# Patient Record
Sex: FEMALE | Race: WHITE | NOT HISPANIC OR LATINO
[De-identification: names, ages, dates, MRNs, and addresses within clinical notes are randomized per-mention and may not be internally consistent; named-entity substitution may affect disease eponyms.]

---

## 2017-05-08 ENCOUNTER — RESULT REVIEW (OUTPATIENT)
Age: 62
End: 2017-05-08

## 2019-04-30 ENCOUNTER — NON-APPOINTMENT (OUTPATIENT)
Age: 64
End: 2019-04-30

## 2019-04-30 ENCOUNTER — APPOINTMENT (OUTPATIENT)
Dept: CARDIOLOGY | Facility: CLINIC | Age: 64
End: 2019-04-30
Payer: COMMERCIAL

## 2019-04-30 VITALS
RESPIRATION RATE: 15 BRPM | HEIGHT: 63 IN | SYSTOLIC BLOOD PRESSURE: 124 MMHG | BODY MASS INDEX: 29.77 KG/M2 | DIASTOLIC BLOOD PRESSURE: 76 MMHG | WEIGHT: 168 LBS | HEART RATE: 68 BPM

## 2019-04-30 PROCEDURE — 93000 ELECTROCARDIOGRAM COMPLETE: CPT

## 2019-04-30 PROCEDURE — 99213 OFFICE O/P EST LOW 20 MIN: CPT

## 2019-05-14 ENCOUNTER — TRANSCRIPTION ENCOUNTER (OUTPATIENT)
Age: 64
End: 2019-05-14

## 2019-05-23 ENCOUNTER — TRANSCRIPTION ENCOUNTER (OUTPATIENT)
Age: 64
End: 2019-05-23

## 2019-05-23 VITALS — BODY MASS INDEX: 20.9 KG/M2 | WEIGHT: 118 LBS

## 2019-06-17 ENCOUNTER — APPOINTMENT (OUTPATIENT)
Dept: CARDIOLOGY | Facility: CLINIC | Age: 64
End: 2019-06-17
Payer: COMMERCIAL

## 2019-06-17 VITALS
DIASTOLIC BLOOD PRESSURE: 79 MMHG | BODY MASS INDEX: 21.44 KG/M2 | SYSTOLIC BLOOD PRESSURE: 126 MMHG | WEIGHT: 121 LBS | RESPIRATION RATE: 15 BRPM | HEIGHT: 63 IN | HEART RATE: 70 BPM

## 2019-06-17 PROCEDURE — 93015 CV STRESS TEST SUPVJ I&R: CPT

## 2019-06-17 PROCEDURE — 99213 OFFICE O/P EST LOW 20 MIN: CPT | Mod: 25

## 2019-06-17 PROCEDURE — ZZZZZ: CPT

## 2019-06-17 PROCEDURE — 93306 TTE W/DOPPLER COMPLETE: CPT

## 2020-06-30 ENCOUNTER — APPOINTMENT (OUTPATIENT)
Dept: CARDIOLOGY | Facility: CLINIC | Age: 65
End: 2020-06-30
Payer: COMMERCIAL

## 2020-06-30 VITALS
DIASTOLIC BLOOD PRESSURE: 80 MMHG | BODY MASS INDEX: 21.62 KG/M2 | HEIGHT: 63 IN | HEART RATE: 60 BPM | WEIGHT: 122 LBS | SYSTOLIC BLOOD PRESSURE: 125 MMHG | RESPIRATION RATE: 15 BRPM

## 2020-06-30 PROCEDURE — ZZZZZ: CPT

## 2020-06-30 PROCEDURE — 93015 CV STRESS TEST SUPVJ I&R: CPT

## 2020-06-30 PROCEDURE — 93306 TTE W/DOPPLER COMPLETE: CPT

## 2020-06-30 PROCEDURE — 99213 OFFICE O/P EST LOW 20 MIN: CPT | Mod: 25

## 2020-07-01 NOTE — PHYSICAL EXAM
[Well Groomed] : well groomed [General Appearance - Well Developed] : well developed [Normal Appearance] : normal appearance [No Deformities] : no deformities [General Appearance - Well Nourished] : well nourished [General Appearance - In No Acute Distress] : no acute distress [Normal Oropharynx] : normal oropharynx [Normal Oral Mucosa] : normal oral mucosa [Normal Conjunctiva] : the conjunctiva exhibited no abnormalities [Normal Jugular Venous A Waves Present] : normal jugular venous A waves present [JVD Elevated _____cm] : JVD elevated [unfilled] ~U cm above clavicle [Normal Jugular Venous V Waves Present] : normal jugular venous V waves present [No Jugular Venous Montgomery A Waves] : no jugular venous montgomery A waves [Respiration, Rhythm And Depth] : normal respiratory rhythm and effort [Bowel Sounds] : normal bowel sounds [Auscultation Breath Sounds / Voice Sounds] : lungs were clear to auscultation bilaterally [Abdomen Soft] : soft [Exaggerated Use Of Accessory Muscles For Inspiration] : no accessory muscle use [Nail Clubbing] : no clubbing of the fingernails [Abnormal Walk] : normal gait [Gait - Sufficient For Exercise Testing] : the gait was sufficient for exercise testing [Cyanosis, Localized] : no localized cyanosis [Skin Color & Pigmentation] : normal skin color and pigmentation [Skin Turgor] : normal skin turgor [] : no rash [Impaired Insight] : insight and judgment were intact [No Anxiety] : not feeling anxious [Oriented To Time, Place, And Person] : oriented to person, place, and time [Normal] : normal [5th Left ICS - MCL] : palpated at the 5th LICS in the midclavicular line [Apical Thrill] : no thrill palpable at the apex [Normal Rate] : normal [No Precordial Heave] : no precordial heave was noted [Normal S2] : normal S2 [Normal S1] : normal S1 [Rhythm Regular] : regular [Click] : no click [S4] : no S4 [S3] : no S3 [No Gallop] : no gallop heard [Pericardial Rub] : no pericardial rub [I] : a grade 1 [Right Carotid Bruit] : no bruit heard over the right carotid [Left Carotid Bruit] : no bruit heard over the left carotid [Right Femoral Bruit] : no bruit heard over the right femoral artery [Left Femoral Bruit] : no bruit heard over the left femoral artery [2+] : left 2+ [No Abnormalities] : the abdominal aorta was not enlarged and no bruit was heard [No Pitting Edema] : no pitting edema present

## 2020-07-01 NOTE — DISCUSSION/SUMMARY
[FreeTextEntry1] : This is a 65-year-old female past medical history significant for mitral valve prolapse, occasional palpitations, occasional dyspnea on exertion, comes in for followup cardiac evaluation.  She denies chest pain, shortness breath, dizziness or syncope.  She has no history of rheumatic fever.  She does not drink excessive caffeine or alcohol.\par The patient had a normal exercise stress test June 30, 2020.\par She will have an echo Doppler examination to evaluate her mitral valve prolapse and mitral valve regurgitation, left ventricular function, and chamber size.\par The patient does get occasional dyspnea on exertion.  She will followup with me in 6 months.\par She has no known cardiac risk factors.\par Electrocardiogram done April 30, 2019 demonstrated sinus bradycardia rate of 58 beats per minute and is otherwise unremarkable.\par The patient will maintain adequate hydration. \par The patient understands that aerobic exercises must be increased to 40 minutes 4 times per week. A detailed discussion of lifestyle modification was done today. The patient has a good understanding of the diagnosis, and treatment plan. Lifestyle modification was also outlined.

## 2020-07-01 NOTE — REASON FOR VISIT
[Follow-Up - Clinic] : a clinic follow-up of [FreeTextEntry1] : Mitral regurgitation, palpitations,  [Mitral Regurgitation] : mitral regurgitation [Palpitations] : palpitations [Dyspnea] : dyspnea

## 2020-10-02 ENCOUNTER — APPOINTMENT (OUTPATIENT)
Dept: MRI IMAGING | Facility: HOSPITAL | Age: 65
End: 2020-10-02
Payer: COMMERCIAL

## 2020-10-02 ENCOUNTER — OUTPATIENT (OUTPATIENT)
Dept: OUTPATIENT SERVICES | Facility: HOSPITAL | Age: 65
LOS: 1 days | End: 2020-10-02
Payer: COMMERCIAL

## 2020-10-02 PROCEDURE — A9579: CPT

## 2020-10-02 PROCEDURE — 72141 MRI NECK SPINE W/O DYE: CPT

## 2020-10-02 PROCEDURE — 70553 MRI BRAIN STEM W/O & W/DYE: CPT | Mod: 26

## 2020-10-02 PROCEDURE — 72141 MRI NECK SPINE W/O DYE: CPT | Mod: 26

## 2020-10-02 PROCEDURE — 70553 MRI BRAIN STEM W/O & W/DYE: CPT

## 2021-06-30 ENCOUNTER — LABORATORY RESULT (OUTPATIENT)
Age: 66
End: 2021-06-30

## 2021-06-30 ENCOUNTER — NON-APPOINTMENT (OUTPATIENT)
Age: 66
End: 2021-06-30

## 2021-06-30 ENCOUNTER — APPOINTMENT (OUTPATIENT)
Dept: CARDIOLOGY | Facility: CLINIC | Age: 66
End: 2021-06-30
Payer: MEDICARE

## 2021-06-30 VITALS
BODY MASS INDEX: 20.2 KG/M2 | RESPIRATION RATE: 16 BRPM | WEIGHT: 114 LBS | SYSTOLIC BLOOD PRESSURE: 100 MMHG | HEART RATE: 72 BPM | OXYGEN SATURATION: 95 % | TEMPERATURE: 97.2 F | HEIGHT: 63 IN | DIASTOLIC BLOOD PRESSURE: 70 MMHG

## 2021-06-30 PROCEDURE — 99214 OFFICE O/P EST MOD 30 MIN: CPT

## 2021-06-30 PROCEDURE — 93000 ELECTROCARDIOGRAM COMPLETE: CPT

## 2021-06-30 NOTE — REASON FOR VISIT
[Hyperlipidemia] : hyperlipidemia [Hypertension] : hypertension [Follow-Up - Clinic] : a clinic follow-up of [Dyspnea] : dyspnea [Mitral Regurgitation] : mitral regurgitation [Palpitations] : palpitations [FreeTextEntry1] : Mitral regurgitation, palpitations,

## 2021-06-30 NOTE — PHYSICAL EXAM
[Well Developed] : well developed [Well Nourished] : well nourished [No Acute Distress] : no acute distress [Normal Venous Pressure] : normal venous pressure [No Carotid Bruit] : no carotid bruit [Normal S1, S2] : normal S1, S2 [No Murmur] : no murmur [No Rub] : no rub [Clear Lung Fields] : clear lung fields [Good Air Entry] : good air entry [No Respiratory Distress] : no respiratory distress  [Soft] : abdomen soft [Non Tender] : non-tender [No Masses/organomegaly] : no masses/organomegaly [Normal Bowel Sounds] : normal bowel sounds [Normal Gait] : normal gait [No Edema] : no edema [No Cyanosis] : no cyanosis [No Clubbing] : no clubbing [No Varicosities] : no varicosities [No Rash] : no rash [No Skin Lesions] : no skin lesions [Moves all extremities] : moves all extremities [No Focal Deficits] : no focal deficits [Normal Speech] : normal speech [Alert and Oriented] : alert and oriented [Normal memory] : normal memory [General Appearance - Well Developed] : well developed [Normal Appearance] : normal appearance [Well Groomed] : well groomed [General Appearance - Well Nourished] : well nourished [No Deformities] : no deformities [General Appearance - In No Acute Distress] : no acute distress [Normal Conjunctiva] : the conjunctiva exhibited no abnormalities [Normal Oral Mucosa] : normal oral mucosa [Normal Oropharynx] : normal oropharynx [JVD Elevated _____cm] : JVD elevated [unfilled] ~U cm above clavicle [Normal Jugular Venous A Waves Present] : normal jugular venous A waves present [Normal Jugular Venous V Waves Present] : normal jugular venous V waves present [No Jugular Venous Montgomery A Waves] : no jugular venous montgomery A waves [Respiration, Rhythm And Depth] : normal respiratory rhythm and effort [Exaggerated Use Of Accessory Muscles For Inspiration] : no accessory muscle use [Auscultation Breath Sounds / Voice Sounds] : lungs were clear to auscultation bilaterally [Bowel Sounds] : normal bowel sounds [Abnormal Walk] : normal gait [Abdomen Soft] : soft [Gait - Sufficient For Exercise Testing] : the gait was sufficient for exercise testing [Nail Clubbing] : no clubbing of the fingernails [Cyanosis, Localized] : no localized cyanosis [Skin Color & Pigmentation] : normal skin color and pigmentation [Skin Turgor] : normal skin turgor [] : no rash [Oriented To Time, Place, And Person] : oriented to person, place, and time [Impaired Insight] : insight and judgment were intact [No Anxiety] : not feeling anxious [5th Left ICS - MCL] : palpated at the 5th LICS in the midclavicular line [Normal] : normal [No Precordial Heave] : no precordial heave was noted [Normal Rate] : normal [Rhythm Regular] : regular [Normal S1] : normal S1 [Normal S2] : normal S2 [No Gallop] : no gallop heard [I] : a grade 1 [2+] : left 2+ [No Abnormalities] : the abdominal aorta was not enlarged and no bruit was heard [No Pitting Edema] : no pitting edema present [Apical Thrill] : no thrill palpable at the apex [S3] : no S3 [S4] : no S4 [Click] : no click [Pericardial Rub] : no pericardial rub [Right Carotid Bruit] : no bruit heard over the right carotid [Left Carotid Bruit] : no bruit heard over the left carotid [Right Femoral Bruit] : no bruit heard over the right femoral artery [Left Femoral Bruit] : no bruit heard over the left femoral artery

## 2021-06-30 NOTE — CARDIOLOGY SUMMARY
[___] : [unfilled] [de-identified] : 6/30/2021 [de-identified] : 6/30/2020 [de-identified] : 6/30/2020

## 2021-06-30 NOTE — ASSESSMENT
[FreeTextEntry1] : This is a 66 year year old female here today for follow up cardiac evaluation. \par She has a past medical history significant for  mitral valve prolapse, occasional palpitations, occasional dyspnea on exertion.\par \par CHIEF COMPLAINT: Follow up appointment. \par \par -Pt is stable from a cardiac standpoint and does not have any complaints at this time. She does have occasional ASKEW when going up some stairs.\par \par BLOOD PRESSURE:\par -BP is well controlled in today's visit.\par \par BLOOD WORK:\par -New blood work was done today, 06/30/2021 to evaluate lipid profile, CBC, BMP, hepatic function, A1C and TSH. \par \par TESTING/REPORTS:\par -EKG done Jun 30, 2021 which demonstrated regular sinus rhythm with nonspecific ST-T wave changes, BPM of 66.\par \par -The patient had a normal exercise stress test June 30, 2020.\par \par -Echocardiogram done on 6/30/2020 demonstrated mild mitral, aortic, tricuspid and pulmonic regurgitation, borderline mild mitral valve prolapse with normal left ventricular systolic function. EF of 65%.\par \par PLAN:\par -The patient will schedule an Exercise Stress Test rule out significant coronary artery disease and will schedule \par an Echo Doppler examination to evaluate murmur, left ventricular function, chamber size, and rule out hypertrophy.\par \par \par I have discussed the plan of care with Ms. MARLYN MOORE  and she  will follow up in 3 months. She is compliant with all of her medications.\par \par The patient understands that aerobic exercises must be increased to minutes 4 times/week and a detailed discussion of lifestyle modification was done today. \par The patient has a good understanding of the diagnosis, treatment plan and lifestyle modification. \par She will contact me at the office for any questions with their care or any changes in their health status.\par \par The patient was seen together with supervision physician Dr. Chan Godinez and the plan of care was discussed with the patient and will be carried out as noted above.\par \par Debra JON

## 2021-06-30 NOTE — DISCUSSION/SUMMARY
[FreeTextEntry1] : Dr. Godinez-(PRIOR VISIT and PMH WITH Dr. Godinez): \par This is a 65-year-old female past medical history significant for mitral valve prolapse, occasional palpitations, occasional dyspnea on exertion, comes in for followup cardiac evaluation.  She denies chest pain, shortness breath, dizziness or syncope.  She has no history of rheumatic fever.  She does not drink excessive caffeine or alcohol.\par \par The patient had a normal exercise stress test June 30, 2020.\par \par She will have an echo Doppler examination to evaluate her mitral valve prolapse and mitral valve regurgitation, left ventricular function, and chamber size.\par \par The patient does get occasional dyspnea on exertion.  She will followup with me in 6 months.\par \par She has no known cardiac risk factors.\par \par Electrocardiogram done April 30, 2019 demonstrated sinus bradycardia rate of 58 beats per minute and is otherwise unremarkable.\par \par The patient will maintain adequate hydration. \par \par The patient understands that aerobic exercises must be increased to 40 minutes 4 times per week. A detailed discussion of lifestyle modification was done today. The patient has a good understanding of the diagnosis, and treatment plan. Lifestyle modification was also outlined.

## 2021-07-01 ENCOUNTER — APPOINTMENT (OUTPATIENT)
Dept: NEUROLOGY | Facility: CLINIC | Age: 66
End: 2021-07-01
Payer: MEDICARE

## 2021-07-01 VITALS
HEART RATE: 72 BPM | DIASTOLIC BLOOD PRESSURE: 72 MMHG | WEIGHT: 115 LBS | BODY MASS INDEX: 20.38 KG/M2 | HEIGHT: 63 IN | SYSTOLIC BLOOD PRESSURE: 111 MMHG

## 2021-07-01 PROCEDURE — 99204 OFFICE O/P NEW MOD 45 MIN: CPT

## 2021-07-01 NOTE — HISTORY OF PRESENT ILLNESS
[FreeTextEntry1] : Patient presents for evaluation of itch.  \par \par She states for many years she has itch left posterior neck and into left jaw and rarely ear, rarely right side.  She states that it is almost always present.  Also feels like a tingling and "icepick" pain and when this happens scalp is very sensitive.  \par \par She has HA daily for the past year, bilateral back of head and sometimes frontal and sinuses.   Symptoms are also related to spasms left side of neck and up back of head - not a cramp.  She takes occasional meds for HA.  \par \par She has also a h/o HA prior to these two years.  MRI brain and rheum w/u not revealing\par \par She is not sure of FH of HA

## 2021-07-01 NOTE — PHYSICAL EXAM
[General Appearance - Alert] : alert [General Appearance - In No Acute Distress] : in no acute distress [Person] : oriented to person [Place] : oriented to place [Time] : oriented to time [Short Term Intact] : short term memory intact [Remote Intact] : remote memory intact [Registration Intact] : recent registration memory intact [Concentration Intact] : normal concentrating ability [Visual Intact] : visual attention was ~T not ~L decreased [Naming Objects] : no difficulty naming common objects [Repeating Phrases] : no difficulty repeating a phrase [Writing A Sentence] : no difficulty writing a sentence [Fluency] : fluency intact [Comprehension] : comprehension intact [Reading] : reading intact [Past History] : adequate knowledge of personal past history [Cranial Nerves Optic (II)] : visual acuity intact bilaterally,  visual fields full to confrontation, pupils equal round and reactive to light [Cranial Nerves Oculomotor (III)] : extraocular motion intact [Cranial Nerves Trigeminal (V)] : facial sensation intact symmetrically [Cranial Nerves Facial (VII)] : face symmetrical [Cranial Nerves Vestibulocochlear (VIII)] : hearing was intact bilaterally [Cranial Nerves Glossopharyngeal (IX)] : tongue and palate midline [Cranial Nerves Accessory (XI - Cranial And Spinal)] : head turning and shoulder shrug symmetric [Cranial Nerves Hypoglossal (XII)] : there was no tongue deviation with protrusion [Motor Tone] : muscle tone was normal in all four extremities [Motor Strength] : muscle strength was normal in all four extremities [No Muscle Atrophy] : normal bulk in all four extremities [Motor Handedness Right-Handed] : the patient is right hand dominant [Sensation Tactile Decrease] : light touch was intact [Sensation Vibration Decrease] : vibration was intact [Proprioception] : proprioception was intact [Abnormal Walk] : normal gait [Balance] : balance was intact [Past-pointing] : there was no past-pointing [Tremor] : no tremor present [2+] : Ankle jerk left 2+ [Plantar Reflex Right Only] : normal on the right [Plantar Reflex Left Only] : normal on the left

## 2021-07-01 NOTE — ASSESSMENT
[FreeTextEntry1] : Patient has chronic migraine and migraine equivalents. \par \par Will start treatment with elavil 10mg QHS.  Will have her see Dr. Bob for further evaluation and treatment

## 2021-09-29 ENCOUNTER — APPOINTMENT (OUTPATIENT)
Dept: NEUROLOGY | Facility: CLINIC | Age: 66
End: 2021-09-29
Payer: MEDICARE

## 2021-09-29 VITALS
SYSTOLIC BLOOD PRESSURE: 114 MMHG | WEIGHT: 118 LBS | HEIGHT: 63 IN | BODY MASS INDEX: 20.91 KG/M2 | HEART RATE: 68 BPM | DIASTOLIC BLOOD PRESSURE: 67 MMHG

## 2021-09-29 DIAGNOSIS — Z86.19 PERSONAL HISTORY OF OTHER INFECTIOUS AND PARASITIC DISEASES: ICD-10-CM

## 2021-09-29 DIAGNOSIS — Z86.79 PERSONAL HISTORY OF OTHER DISEASES OF THE CIRCULATORY SYSTEM: ICD-10-CM

## 2021-09-29 DIAGNOSIS — Z78.9 OTHER SPECIFIED HEALTH STATUS: ICD-10-CM

## 2021-09-29 PROCEDURE — 99215 OFFICE O/P EST HI 40 MIN: CPT

## 2021-09-29 NOTE — DISCUSSION/SUMMARY
[FreeTextEntry1] : 1. Left sided head and neck scalp sensitivity and itch : possibly due to allodynia / migraine variant vs neuralgia \par \par 2. Start coQ 10 100mg daily and continue magnesium 200mg daily and keep symptom diary and may use indocin 25 mg as needed for abortive of the ice pick headaches or neuralgic pains. \par \par 3. Follow up in 3-4 months and all questions answered.

## 2021-09-29 NOTE — REVIEW OF SYSTEMS
[Fever] : no fever [Chills] : no chills [Feeling Tired] : feeling tired [As Noted in HPI] : as noted in HPI [Decr. Concentrating Ability] : decreased concentrating ability [Tingling] : tingling [Abnormal Sensation] : an abnormal sensation [Dizziness] : no dizziness [Lightheadedness] : no lightheadedness [Tension Headache] : tension-type headaches [Joint Pain] : joint pain [Joint Stiffness] : joint stiffness [Itching] : itching [Negative] : Heme/Lymph [FreeTextEntry4] : bilateral tinnitus  [FreeTextEntry9] : fingers and knees

## 2021-09-29 NOTE — END OF VISIT
I informed patient biopsies of duodenum were negative for celiac disease  Biopsy esophagus were negative for Robbins's esophagus but did reveal some chronic inflammation  Biopsies stomach were negative for H pylori  Follow-up in my office in about 4 months    Thank you [Time Spent: ___ minutes] : I have spent [unfilled] minutes of time on the encounter. [>50% of the face to face encounter time was spent on counseling and/or coordination of care for ___] : Greater than 50% of the face to face encounter time was spent on counseling and/or coordination of care for [unfilled]

## 2021-09-29 NOTE — HISTORY OF PRESENT ILLNESS
[FreeTextEntry1] : Patient was referred by Dr El for evaluation of scalp changes and chronic migraines. She has had many years of left sided scalp over the past 10 years but it has been getting worse. It is mainly located on the left posterior neck region to her jaw and it is sensitive to touch. It is triggered by reclining on a chair, stress and anxiety.\par She has a chronic hx of left sided neck and jaw pains but she also suffers with TMJ. \par She had been started on elavil 10mg daily for prevention by Dr El however she had to stop after 2 months due to side effects of weight gain, hair loss and constipation and trouble urinating and her hot flashes increased. \par \par She did have an MRI of the brain and workup previously. \par \par She is using magnesium powder at night and she feels it is helping.

## 2021-09-29 NOTE — PHYSICAL EXAM
[General Appearance - Alert] : alert [Oriented To Time, Place, And Person] : oriented to person, place, and time [Person] : oriented to person [Place] : oriented to place [Time] : oriented to time [Cranial Nerves Optic (II)] : visual acuity intact bilaterally,  visual fields full to confrontation, pupils equal round and reactive to light [Cranial Nerves Oculomotor (III)] : extraocular motion intact [Cranial Nerves Trigeminal (V)] : facial sensation intact symmetrically [Cranial Nerves Facial (VII)] : face symmetrical [Cranial Nerves Vestibulocochlear (VIII)] : hearing was intact bilaterally [Cranial Nerves Glossopharyngeal (IX)] : tongue and palate midline [Cranial Nerves Accessory (XI - Cranial And Spinal)] : head turning and shoulder shrug symmetric [Cranial Nerves Hypoglossal (XII)] : there was no tongue deviation with protrusion [Motor Tone] : muscle tone was normal in all four extremities [Motor Strength] : muscle strength was normal in all four extremities [Involuntary Movements] : no involuntary movements were seen [Motor Handedness Right-Handed] : the patient is right hand dominant [Paresis Pronator Drift Right-Sided] : no pronator drift on the right [Paresis Pronator Drift Left-Sided] : no pronator drift on the left [Sensation Tactile Decrease] : light touch was intact [Sensation Pain / Temperature Decrease] : pain and temperature was intact [Romberg's Sign] : Romberg's sign was negtive [Limited Balance] : balance was intact [Past-pointing] : there was no past-pointing [Tremor] : no tremor present [Coordination - Dysmetria Impaired Finger-to-Nose Bilateral] : not present [2+] : Ankle jerk left 2+ [FreeTextEntry5] : scalp sensitivity on left side of head and posterior neck  [Extraocular Movements] : extraocular movements were intact [Neck Appearance] : the appearance of the neck was normal [] : no rash [Skin Lesions] : no skin lesions

## 2021-12-30 ENCOUNTER — APPOINTMENT (OUTPATIENT)
Dept: OTOLARYNGOLOGY | Facility: CLINIC | Age: 66
End: 2021-12-30
Payer: MEDICARE

## 2021-12-30 VITALS
DIASTOLIC BLOOD PRESSURE: 73 MMHG | HEIGHT: 63 IN | TEMPERATURE: 97.5 F | WEIGHT: 118 LBS | BODY MASS INDEX: 20.91 KG/M2 | HEART RATE: 78 BPM | SYSTOLIC BLOOD PRESSURE: 110 MMHG

## 2021-12-30 DIAGNOSIS — H93.13 TINNITUS, BILATERAL: ICD-10-CM

## 2021-12-30 DIAGNOSIS — H91.93 UNSPECIFIED HEARING LOSS, BILATERAL: ICD-10-CM

## 2021-12-30 PROCEDURE — 99204 OFFICE O/P NEW MOD 45 MIN: CPT

## 2021-12-30 PROCEDURE — 92557 COMPREHENSIVE HEARING TEST: CPT

## 2021-12-30 PROCEDURE — 92567 TYMPANOMETRY: CPT

## 2021-12-30 NOTE — HISTORY OF PRESENT ILLNESS
[de-identified] : 65 y/o F c/o  initially intermittent b/l tinnitus, but now constant b/l tinnitus x 10 years, describes it as a static, or constant buzz, and the left ear also with siren like sound. \par Pt states if in a very noisy restaurant, or crowded area, or with stress will get louder and cause some dull pain in ear.  \par Pt states possible hearing changes, feels sxs are getting worse. \par Also with some itching and numbness on the left side of the face, itch is very Prominent. \par no Vertigo, pain, drainage or facial weakness.\par \par

## 2021-12-30 NOTE — REVIEW OF SYSTEMS
[Ear Pain] : ear pain [Ear Itch] : ear itch [Ear Noises] : ear noises [Negative] : Heme/Lymph [As Noted in HPI] : as noted in HPI

## 2021-12-30 NOTE — REASON FOR VISIT
[Initial Consultation] : an initial consultation for [Tinnitus] : tinnitus [FreeTextEntry2] : tinnitus

## 2021-12-30 NOTE — ASSESSMENT
[FreeTextEntry1] : 67 y/o F who presents with b/l tinnitus, on exam overall benign ear exam \par - audio done: \par - patient with tone tinnitus - discussed pathophysiology of tinnitus and usual prognosis and treatment. will try otovits and masking techniques. If persist and bothersome can try pitch therapy, or acupuncture\par - consider tinnitus specialist, referred to tinnitus center \par - ordered VNG to access imbalance, will call with results\par \par \par pt also with left sided numbness \par - advised to follow up with neuro for possible neuolgia \par - had previous MRI which was normal \par \par annual f/up

## 2021-12-30 NOTE — END OF VISIT
[FreeTextEntry3] : I personally saw and examined MARLYN MOORE in detail.  I spoke to JAVIER Sanchez regarding the assessment and plan of care. I performed the procedures and relevant physical exam.  I have reviewed the above assessment and plan of care and I agree.  I have made changes to the body of the note wherever necessary and appropriate.

## 2022-01-20 ENCOUNTER — APPOINTMENT (OUTPATIENT)
Dept: OTOLARYNGOLOGY | Facility: CLINIC | Age: 67
End: 2022-01-20
Payer: MEDICARE

## 2022-01-20 PROCEDURE — 92557 COMPREHENSIVE HEARING TEST: CPT

## 2022-01-20 PROCEDURE — 92567 TYMPANOMETRY: CPT

## 2022-01-20 PROCEDURE — ZZZZZ: CPT

## 2022-01-20 PROCEDURE — 92540 BASIC VESTIBULAR EVALUATION: CPT

## 2022-01-20 PROCEDURE — 92537 CALORIC VSTBLR TEST W/REC: CPT

## 2022-01-20 PROCEDURE — 92547 SUPPLEMENTAL ELECTRICAL TEST: CPT

## 2022-01-25 ENCOUNTER — NON-APPOINTMENT (OUTPATIENT)
Age: 67
End: 2022-01-25

## 2022-03-08 ENCOUNTER — APPOINTMENT (OUTPATIENT)
Dept: NEUROLOGY | Facility: CLINIC | Age: 67
End: 2022-03-08
Payer: MEDICARE

## 2022-03-08 VITALS
DIASTOLIC BLOOD PRESSURE: 67 MMHG | HEIGHT: 63 IN | WEIGHT: 117 LBS | HEART RATE: 69 BPM | SYSTOLIC BLOOD PRESSURE: 111 MMHG | BODY MASS INDEX: 20.73 KG/M2

## 2022-03-08 DIAGNOSIS — L29.9 PRURITUS, UNSPECIFIED: ICD-10-CM

## 2022-03-08 DIAGNOSIS — H93.A2 PULSATILE TINNITUS, LEFT EAR: ICD-10-CM

## 2022-03-08 DIAGNOSIS — R20.8 OTHER DISTURBANCES OF SKIN SENSATION: ICD-10-CM

## 2022-03-08 PROCEDURE — 99215 OFFICE O/P EST HI 40 MIN: CPT

## 2022-03-08 RX ORDER — AMITRIPTYLINE HYDROCHLORIDE 10 MG/1
10 TABLET, FILM COATED ORAL
Qty: 30 | Refills: 5 | Status: DISCONTINUED | COMMUNITY
Start: 2021-07-01 | End: 2022-03-08

## 2022-03-08 RX ORDER — ESTRADIOL 10 UG/1
10 INSERT VAGINAL
Refills: 0 | Status: DISCONTINUED | COMMUNITY
Start: 2021-07-01 | End: 2022-03-08

## 2022-03-08 RX ORDER — INDOMETHACIN 25 MG/1
25 CAPSULE ORAL
Qty: 30 | Refills: 0 | Status: DISCONTINUED | COMMUNITY
Start: 2021-09-29 | End: 2022-03-08

## 2022-03-08 RX ORDER — ACYCLOVIR 200 MG/1
200 CAPSULE ORAL DAILY
Refills: 0 | Status: DISCONTINUED | COMMUNITY
Start: 2021-07-01 | End: 2022-03-08

## 2022-03-08 RX ORDER — UBIDECARENONE 100 MG
100 CAPSULE ORAL
Qty: 90 | Refills: 3 | Status: DISCONTINUED | COMMUNITY
Start: 2021-09-29 | End: 2022-03-08

## 2022-03-08 NOTE — DISCUSSION/SUMMARY
[FreeTextEntry1] : 66-year-old woman who is here for initial consultation of left-sided pruritus and pain over the past 8 years.  Patient has tried amitriptyline and indomethacin to no avail.  Patient may benefit from a sleep study to evaluate for possible sleep apnea as chronic sleep deprivation may cause her headache.  Patient was also advised to see a psychologist for stress from the ongoing pandemic.  Patient was offered neuropathic medication such as Cymbalta however she was hesitant.  I will add on a piece of paper for her so that she could look it up and we can discuss in detail during her follow-up visit.\par \par I spent the time noted on the day of this patient encounter preparing for, providing and documenting the above E/M service and counseling and educate patient on differential, workup, disease course, and treatment/management. Education was provided to the patient during this encounter. All questions and concerns were answered and addressed in detail. The patient verbalized understanding and agreed to plan. Patient was advised to continue to monitor for neurologic symptoms and to notify my office or go to the nearest emergency room if there are any changes. Any orders/referrals and communications were provided as well. \par Side effects of the above medications were discussed in detail including but not limited to applicable black box warning and teratogenicity as appropriate. \par Patient was advised to bring previous records to my office. \par \par \par

## 2022-03-08 NOTE — HISTORY OF PRESENT ILLNESS
[FreeTextEntry1] : 66-year-old woman who is here for initial consultation for 8 years duration of mild headache in the morning.  Patient experiences pruritus in the left jaw to the left side of her head and sometimes the right side.  Patient describes symptoms as tingling and pain in certain positions.  Patient also has an ice pick headache that last for half a day intermittently.  Patient has seen many neurologists including Thelma Bob and [facility].  Patient was tried on indomethacin however patient stopped because of potential side effects and she did not think that it helped.  Patient is currently on magnesium 300 mg daily.  Patient also experiences left-sided tinnitus.  Patient has also been tried on amitriptyline for her presumed migraine.  Patient has migrainous features including nausea and vomiting.  Patient states that she has also seen many endocrinologist and rheumatologist internist and neurologist and work-up has thus far been negative.\par \par Patient describes insomnia and so far screen time before bed is one of the causes.  Patient also snores at night and therefore undiagnosed sleep apnea might be another contributing factor.\par \par Upon further evaluation patient admits to a lot of stress and trauma from the ongoing pandemic.  Reassurance and emotional support was given to her.

## 2022-06-17 ENCOUNTER — APPOINTMENT (OUTPATIENT)
Dept: NEUROLOGY | Facility: CLINIC | Age: 67
End: 2022-06-17

## 2022-07-06 ENCOUNTER — APPOINTMENT (OUTPATIENT)
Dept: CARDIOLOGY | Facility: CLINIC | Age: 67
End: 2022-07-06

## 2022-07-06 VITALS
TEMPERATURE: 97.7 F | BODY MASS INDEX: 20.38 KG/M2 | HEIGHT: 63 IN | WEIGHT: 115 LBS | SYSTOLIC BLOOD PRESSURE: 118 MMHG | DIASTOLIC BLOOD PRESSURE: 78 MMHG

## 2022-07-06 PROCEDURE — 99213 OFFICE O/P EST LOW 20 MIN: CPT | Mod: 25

## 2022-07-06 PROCEDURE — 93015 CV STRESS TEST SUPVJ I&R: CPT

## 2022-07-06 PROCEDURE — 93306 TTE W/DOPPLER COMPLETE: CPT

## 2022-07-06 NOTE — DISCUSSION/SUMMARY
[FreeTextEntry1] : This is a 67-year-old female past medical history significant for mitral valve prolapse, occasional palpitations, occasional dyspnea on exertion, comes in for followup cardiac evaluation.  She denies chest pain, shortness breath, dizziness or syncope.  She has no history of rheumatic fever.  She does not drink excessive caffeine or alcohol.\par The patient had normal exercise stress test July 6, 2022.\par She will be going for fasting blood work for her primary care physician in the next few weeks.\par Echo Doppler examination done June 30, 2020 demonstrated borderline mitral valve prolapse and minimal to mild mitral valve regurgitation, mild aortic valve regurgitation, minimal to mild tricuspid valve regurgitation, minimal pulmonic valve regurgitation.\par Blood work done October 26, 2021 demonstrated cholesterol 232, triglycerides 56, , and LDL calculated 120 with a hemoglobin A1c of 5.8.\par The patient will continue on her current diet and exercise program.\par \par The patient had a normal exercise stress test June 30, 2020.\par She has no known cardiac risk factors.\par Electrocardiogram done April 30, 2019 demonstrated sinus bradycardia rate of 58 beats per minute and is otherwise unremarkable.\par The patient will maintain adequate hydration. \par The patient understands that aerobic exercises must be increased to 40 minutes 4 times per week. A detailed discussion of lifestyle modification was done today. The patient has a good understanding of the diagnosis, and treatment plan. Lifestyle modification was also outlined.

## 2022-07-06 NOTE — PHYSICAL EXAM
[General Appearance - Well Developed] : well developed [Normal Appearance] : normal appearance [Well Groomed] : well groomed [General Appearance - Well Nourished] : well nourished [No Deformities] : no deformities [General Appearance - In No Acute Distress] : no acute distress [Normal Conjunctiva] : the conjunctiva exhibited no abnormalities [Normal Oral Mucosa] : normal oral mucosa [Normal Oropharynx] : normal oropharynx [JVD Elevated _____cm] : JVD elevated [unfilled] ~U cm above clavicle [Normal Jugular Venous A Waves Present] : normal jugular venous A waves present [Normal Jugular Venous V Waves Present] : normal jugular venous V waves present [No Jugular Venous Montgomery A Waves] : no jugular venous montgomery A waves [Respiration, Rhythm And Depth] : normal respiratory rhythm and effort [Exaggerated Use Of Accessory Muscles For Inspiration] : no accessory muscle use [Auscultation Breath Sounds / Voice Sounds] : lungs were clear to auscultation bilaterally [Bowel Sounds] : normal bowel sounds [Abdomen Soft] : soft [Abnormal Walk] : normal gait [Gait - Sufficient For Exercise Testing] : the gait was sufficient for exercise testing [Nail Clubbing] : no clubbing of the fingernails [Cyanosis, Localized] : no localized cyanosis [Skin Color & Pigmentation] : normal skin color and pigmentation [Skin Turgor] : normal skin turgor [] : no rash [Oriented To Time, Place, And Person] : oriented to person, place, and time [Impaired Insight] : insight and judgment were intact [No Anxiety] : not feeling anxious [5th Left ICS - MCL] : palpated at the 5th LICS in the midclavicular line [Normal] : normal [No Precordial Heave] : no precordial heave was noted [Normal Rate] : normal [Rhythm Regular] : regular [Normal S1] : normal S1 [Normal S2] : normal S2 [No Gallop] : no gallop heard [I] : a grade 1 [2+] : left 2+ [No Abnormalities] : the abdominal aorta was not enlarged and no bruit was heard [No Pitting Edema] : no pitting edema present [Apical Thrill] : no thrill palpable at the apex [S3] : no S3 [S4] : no S4 [Click] : no click [Pericardial Rub] : no pericardial rub [Right Carotid Bruit] : no bruit heard over the right carotid [Left Carotid Bruit] : no bruit heard over the left carotid [Right Femoral Bruit] : no bruit heard over the right femoral artery [Left Femoral Bruit] : no bruit heard over the left femoral artery

## 2022-07-06 NOTE — REASON FOR VISIT
[CV Risk Factors and Non-Cardiac Disease] : CV risk factors and non-cardiac disease [Follow-Up - Clinic] : a clinic follow-up of [Dyspnea] : dyspnea [Mitral Regurgitation] : mitral regurgitation [Palpitations] : palpitations [FreeTextEntry1] : Mitral regurgitation, palpitations,

## 2022-07-14 ENCOUNTER — APPOINTMENT (OUTPATIENT)
Dept: PLASTIC SURGERY | Facility: CLINIC | Age: 67
End: 2022-07-14

## 2022-07-18 ENCOUNTER — NON-APPOINTMENT (OUTPATIENT)
Age: 67
End: 2022-07-18

## 2022-10-23 ENCOUNTER — EMERGENCY (EMERGENCY)
Facility: HOSPITAL | Age: 67
LOS: 1 days | Discharge: HOME | End: 2022-10-23
Admitting: STUDENT IN AN ORGANIZED HEALTH CARE EDUCATION/TRAINING PROGRAM

## 2022-10-23 VITALS
WEIGHT: 115.96 LBS | SYSTOLIC BLOOD PRESSURE: 135 MMHG | HEIGHT: 63 IN | HEART RATE: 72 BPM | RESPIRATION RATE: 16 BRPM | TEMPERATURE: 98 F | OXYGEN SATURATION: 100 % | DIASTOLIC BLOOD PRESSURE: 64 MMHG

## 2022-10-23 PROCEDURE — 99284 EMERGENCY DEPT VISIT MOD MDM: CPT

## 2022-10-23 NOTE — ED ADULT TRIAGE NOTE - CHIEF COMPLAINT QUOTE
Pt arrives to ED reporting she has been seeing floaters and "flashers" in her vision for the past week with it worsening today.  Pt denies medical issues.  Pt uses glasses.  fs = 116.
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
## 2022-10-24 NOTE — CONSULT NOTE ADULT - ASSESSMENT
Assessment and Recommendations:  67y female with no past medical history/ocular history presenting with 1 week on increase in flaoters/flashes in right eye consulted for found to have posterior vitreous detachment (PVD).     # PVD right eye   - Increase in flashes/floaters over 1 week   - No sign of RD or retinal tear on exam   -  Patient instructed with retinal detachment precautions   - VA 20/30 OD, 20/25 OS   - Will be seen in clinic later today.       Outpatient Follow-up: Patient should follow-up with his/her ophthalmologist or with [facility] Department of Ophthalmology     [address] [place] [address]. [address]  [place] [address]  [phone]    Vinnie Lora MD, PGY-2  Also available on [employer] Teams
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
## 2022-10-24 NOTE — ED PROVIDER NOTE - NSFOLLOWUPCLINICS_GEN_ALL_ED_FT
[facility] - Ophthalmology  Ophthalmology  [address], [address]  [place] [address]  Phone: [phone]  Fax:   Follow Up Time: Routine

## 2022-10-24 NOTE — ED PROVIDER NOTE - PHYSICAL EXAMINATION
Vital signs reviewed.   CONSTITUTIONAL: Well-appearing; well-nourished; in no apparent distress. Non-toxic appearing.   HEAD: Normocephalic, atraumatic.  EYES: PERRL, EOM intact, visual fields intact, normal conjunctiva and no sclera injection noted. 20/70 without correction  ENT: normal nose; no rhinorrhea  NECK/LYMPH: Supple; non-tender; no cervical lymphadenopathy.  CARD: Normal S1, S2  RESP: Normal chest excursion with respiration; breath sounds clear and equal bilaterally  ABD/GI: soft, non-distended; non-tender  EXT/MS: moves all extremities; distal pulses are normal, no pedal edema.  SKIN: Normal for age and race; warm; dry; good turgor; no apparent lesions or exudate noted.  NEURO: Awake, alert, oriented x 3.  PSYCH: Normal mood; appropriate affect.

## 2022-10-24 NOTE — ED PROVIDER NOTE - NSFOLLOWUPINSTRUCTIONS_ED_ALL_ED_FT
1) Follow up with your Opthalmology clinic for further evaluation  2) Return to the ED immediately for new or worsening symptoms including fever, chills, nausea/vomiting, dizziness, headache, chest pain, shortness of breath  3) Please continue to take any home medications as prescribed. Take Tylenol 325 mg every 4 hours for pain relief/fever control or ibuprofen 600 mg every 6 hours for pain relief/fever control  4) Your test results from your ED visit were discussed with you prior to discharge  5) You were provided with a copy of your test results

## 2022-10-24 NOTE — ED PROVIDER NOTE - NS ED ROS FT
Constitutional: (-) fever   Head: Normal cephalic, Atraumatic  Eyes/ENT: (+) floaters  Cardiovascular: (-) chest pain, (-) wheezing  Respiratory: (-) cough, (-) shortness of breath  Gastrointestinal: (-) vomiting, (-) diarrhea, (-) abdominal pain  : (-) dysuria   Musculoskeletal: (-) back pain  Integumentary: (-) rash, (-) edema  Neurological: (-)loc  Allergic/Immunologic: (-) pruritus

## 2022-10-24 NOTE — ED PROVIDER NOTE - CLINICAL SUMMARY MEDICAL DECISION MAKING FREE TEXT BOX
66 Y/O F w/ no PMH presents to ER for RT eye vision changes.   No acute findings on physical exam  Seen by optho recommend clinic follow up

## 2022-10-24 NOTE — ED PROVIDER NOTE - PATIENT PORTAL LINK FT
You can access the FollowMyHealth Patient Portal offered by [facility] by registering at the following website: [email or web address]. By joining [facility]’s FollowMyHealth portal, you will also be able to view your health information using other applications (apps) compatible with our system.

## 2022-10-28 ENCOUNTER — APPOINTMENT (OUTPATIENT)
Dept: OPHTHALMOLOGY | Facility: CLINIC | Age: 67
End: 2022-10-28

## 2022-10-28 ENCOUNTER — NON-APPOINTMENT (OUTPATIENT)
Age: 67
End: 2022-10-28

## 2022-10-28 PROCEDURE — 92004 COMPRE OPH EXAM NEW PT 1/>: CPT

## 2022-11-11 ENCOUNTER — APPOINTMENT (OUTPATIENT)
Dept: OPHTHALMOLOGY | Facility: CLINIC | Age: 67
End: 2022-11-11

## 2022-11-11 ENCOUNTER — NON-APPOINTMENT (OUTPATIENT)
Age: 67
End: 2022-11-11

## 2022-11-11 PROCEDURE — 92014 COMPRE OPH EXAM EST PT 1/>: CPT

## 2022-11-21 ENCOUNTER — APPOINTMENT (OUTPATIENT)
Dept: NEUROLOGY | Facility: CLINIC | Age: 67
End: 2022-11-21

## 2022-11-21 VITALS
HEIGHT: 63 IN | WEIGHT: 115 LBS | DIASTOLIC BLOOD PRESSURE: 63 MMHG | BODY MASS INDEX: 20.38 KG/M2 | SYSTOLIC BLOOD PRESSURE: 106 MMHG | HEART RATE: 70 BPM

## 2022-11-21 DIAGNOSIS — R20.0 ANESTHESIA OF SKIN: ICD-10-CM

## 2022-11-21 PROCEDURE — 99215 OFFICE O/P EST HI 40 MIN: CPT

## 2022-11-21 NOTE — HISTORY OF PRESENT ILLNESS
[FreeTextEntry1] : 67-year-old woman who is here for initial consultation for 8 years duration of mild headache in the morning.  Patient experiences pruritus in the left jaw to the left side of her head and sometimes the right side.  Patient describes symptoms as tingling and pain in certain positions.  Patient also has an ice pick headache that last for half a day intermittently.  Patient has seen many neurologists including Thelma Bob and Sienna.  Patient was tried on indomethacin however patient stopped because of potential side effects and she did not think that it helped.  Patient is currently on magnesium 300 mg daily.  Patient also experiences left-sided tinnitus.  Patient has also been tried on amitriptyline for her presumed migraine.  Patient has migrainous features including nausea and vomiting.  Patient states that she has also seen many endocrinologist and rheumatologist internist and neurologist and work-up has thus far been negative.\par \par Patient describes insomnia and so far screen time before bed is one of the causes.  Patient also snores at night and therefore undiagnosed sleep apnea might be another contributing factor.\par \par Upon further evaluation patient admits to a lot of stress and trauma from the ongoing pandemic.  Reassurance and emotional support was given to her.\par \par Interval history 11/21/22: Since her last visit patient had resolution of her pruritus and pain.  Patient still has musculoskeletal neck pain and icepick headaches that occurs frequently.  Patient has been undergoing a lot of stress with her granddaughter who has recent admissions for suicidal ideation.  Website for caregiver support was provided to patient and we discussed the trial of Cymbalta for headache prevention along with helping her during this difficult time.  Patient declined as she does not want to use any medications for fear of side effects.  Patient is currently seeing a therapist.  Patient was supposed to obtain a sleep study for sleep apnea as a cause of her constant inability to sleep well at night.  We will reach out to my staff to help arrange for this study.

## 2022-11-21 NOTE — DISCUSSION/SUMMARY
[FreeTextEntry1] : 67-year-old woman who is here for follow-up of her left-sided pruritus.  Patient has resolution of the symptoms and she also has chronic sleep deprivation for which we will look into having her obtain sleep study for sleep apnea.  Patient currently has neck pain and headaches that are likely due to her recent stressors.  Caregiver support resources were provided to the patient along with trial of Cymbalta however patient declined Cymbalta.  We will have her follow-up in 3 months if not earlier.\par \par I spent the time noted on the day of this patient encounter preparing for, providing and documenting the above E/M service and counseling and educate patient on differential, workup, disease course, and treatment/management. Education was provided to the patient during this encounter. All questions and concerns were answered and addressed in detail. The patient verbalized understanding and agreed to plan. Patient was advised to continue to monitor for neurologic symptoms and to notify my office or go to the nearest emergency room if there are any changes. Any orders/referrals and communications were provided as well. \par Side effects of the above medications were discussed in detail including but not limited to applicable black box warning and teratogenicity as appropriate. \par Patient was advised to bring previous records to my office. \par \par \par

## 2022-12-13 ENCOUNTER — APPOINTMENT (OUTPATIENT)
Dept: NEUROLOGY | Facility: CLINIC | Age: 67
End: 2022-12-13

## 2022-12-13 PROCEDURE — 95806 SLEEP STUDY UNATT&RESP EFFT: CPT

## 2023-01-16 ENCOUNTER — NON-APPOINTMENT (OUTPATIENT)
Age: 68
End: 2023-01-16

## 2023-02-03 ENCOUNTER — APPOINTMENT (OUTPATIENT)
Dept: ORTHOPEDIC SURGERY | Facility: CLINIC | Age: 68
End: 2023-02-03

## 2023-03-02 ENCOUNTER — APPOINTMENT (OUTPATIENT)
Dept: NEUROLOGY | Facility: CLINIC | Age: 68
End: 2023-03-02

## 2023-03-11 ENCOUNTER — NON-APPOINTMENT (OUTPATIENT)
Age: 68
End: 2023-03-11

## 2023-07-18 ENCOUNTER — LABORATORY RESULT (OUTPATIENT)
Age: 68
End: 2023-07-18

## 2023-07-18 ENCOUNTER — APPOINTMENT (OUTPATIENT)
Dept: CARDIOLOGY | Facility: CLINIC | Age: 68
End: 2023-07-18
Payer: MEDICARE

## 2023-07-18 VITALS
HEIGHT: 63 IN | BODY MASS INDEX: 20.02 KG/M2 | SYSTOLIC BLOOD PRESSURE: 112 MMHG | RESPIRATION RATE: 16 BRPM | OXYGEN SATURATION: 99 % | TEMPERATURE: 98.3 F | WEIGHT: 113 LBS | DIASTOLIC BLOOD PRESSURE: 78 MMHG | HEART RATE: 56 BPM

## 2023-07-18 DIAGNOSIS — R06.09 OTHER FORMS OF DYSPNEA: ICD-10-CM

## 2023-07-18 PROCEDURE — 93306 TTE W/DOPPLER COMPLETE: CPT

## 2023-07-18 PROCEDURE — 93015 CV STRESS TEST SUPVJ I&R: CPT

## 2023-07-18 PROCEDURE — 99213 OFFICE O/P EST LOW 20 MIN: CPT | Mod: 25

## 2023-07-18 NOTE — REVIEW OF SYSTEMS
[Dyspnea on exertion] : dyspnea during exertion [Negative] : Heme/Lymph [SOB] : no shortness of breath [Chest Discomfort] : no chest discomfort [Lower Ext Edema] : no extremity edema [Palpitations] : no palpitations [Orthopnea] : no orthopnea [PND] : no PND [Syncope] : no syncope

## 2023-07-18 NOTE — DISCUSSION/SUMMARY
[FreeTextEntry1] : This is a 68-year-old female past medical history significant for mitral valve prolapse, occasional palpitations, occasional dyspnea on exertion, comes in for followup cardiac evaluation.  She denies chest pain, shortness breath, dizziness or syncope.  She has no history of rheumatic fever.  She does not drink excessive caffeine or alcohol.\par The patient had a normal exercise stress test July 18, 2023.\par Lipid panel done November 18, 2022 demonstrated cholesterol 243, triglycerides of 55, , LDL calculated 126 mg/dL.\par She will continue on her current diet and exercise program.\par The patient had normal exercise stress test July 6, 2022.\par Echo Doppler examination done June 30, 2020 demonstrated borderline mitral valve prolapse and minimal to mild mitral valve regurgitation, mild aortic valve regurgitation, minimal to mild tricuspid valve regurgitation, minimal pulmonic valve regurgitation.\par Blood work done October 26, 2021 demonstrated cholesterol 232, triglycerides 56, , and LDL calculated 120 with a hemoglobin A1c of 5.8.\par The patient had a normal exercise stress test June 30, 2020.\par Electrocardiogram done April 30, 2019 demonstrated sinus bradycardia rate of 58 beats per minute and is otherwise unremarkable.\par The patient will maintain adequate hydration. \par The patient understands that aerobic exercises must be increased to 40 minutes 4 times per week. A detailed discussion of lifestyle modification was done today. The patient has a good understanding of the diagnosis, and treatment plan. Lifestyle modification was also outlined.

## 2023-08-25 PROBLEM — R06.09 DYSPNEA ON EXERTION: Status: ACTIVE | Noted: 2019-04-30

## 2024-03-18 ENCOUNTER — NON-APPOINTMENT (OUTPATIENT)
Age: 69
End: 2024-03-18

## 2024-03-18 ENCOUNTER — APPOINTMENT (OUTPATIENT)
Dept: CARDIOLOGY | Facility: CLINIC | Age: 69
End: 2024-03-18
Payer: MEDICARE

## 2024-03-18 VITALS
BODY MASS INDEX: 20.02 KG/M2 | OXYGEN SATURATION: 98 % | WEIGHT: 113 LBS | HEART RATE: 68 BPM | SYSTOLIC BLOOD PRESSURE: 105 MMHG | TEMPERATURE: 97.2 F | HEIGHT: 63 IN | DIASTOLIC BLOOD PRESSURE: 62 MMHG | RESPIRATION RATE: 16 BRPM

## 2024-03-18 VITALS — HEART RATE: 68 BPM | OXYGEN SATURATION: 98 %

## 2024-03-18 DIAGNOSIS — I34.0 NONRHEUMATIC MITRAL (VALVE) INSUFFICIENCY: ICD-10-CM

## 2024-03-18 DIAGNOSIS — R73.03 PREDIABETES.: ICD-10-CM

## 2024-03-18 DIAGNOSIS — R42 DIZZINESS AND GIDDINESS: ICD-10-CM

## 2024-03-18 DIAGNOSIS — I34.1 NONRHEUMATIC MITRAL (VALVE) PROLAPSE: ICD-10-CM

## 2024-03-18 PROCEDURE — G2211 COMPLEX E/M VISIT ADD ON: CPT | Mod: NC,1L

## 2024-03-18 PROCEDURE — 93000 ELECTROCARDIOGRAM COMPLETE: CPT

## 2024-03-18 PROCEDURE — 99214 OFFICE O/P EST MOD 30 MIN: CPT | Mod: 25

## 2024-03-18 RX ORDER — ROSUVASTATIN CALCIUM 10 MG/1
10 TABLET, FILM COATED ORAL
Qty: 90 | Refills: 1 | Status: ACTIVE | COMMUNITY
Start: 2024-03-18 | End: 1900-01-01

## 2024-03-18 NOTE — REVIEW OF SYSTEMS
[Dyspnea on exertion] : dyspnea during exertion [Negative] : Heme/Lymph [SOB] : no shortness of breath [Lower Ext Edema] : no extremity edema [Chest Discomfort] : no chest discomfort [Orthopnea] : no orthopnea [Palpitations] : no palpitations [Syncope] : no syncope [PND] : no PND

## 2024-03-18 NOTE — DISCUSSION/SUMMARY
[FreeTextEntry1] : This is a 68-year-old female past medical history significant for mitral valve prolapse, occasional palpitations, occasional dyspnea on exertion, comes in for followup cardiac evaluation.  She denies chest pain, shortness breath, dizziness or syncope.  She has no history of rheumatic fever.  She does not drink excessive caffeine or alcohol. Electrocardiogram done March 18, 2024 demonstrated sinus bradycardia rate of 58 bpm is otherwise unremarkable. The patient had recent blood work done with LDL cholesterol above target. I recommend the patient start Crestor 10 mg daily.  She will have follow-up blood work done in 6 weeks. Patient is considering going to [employer] some point in the future, but may not. The patient had a normal exercise stress test July 18, 2023. Echo Doppler examination done July 18, 2023 demonstrated mild mitral valve regurgitation, mild tricuspid valve regurgitation, bileaflet mitral valve prolapse, mild aortic valve regurgitation, normal left ventricular function with estimate ejection fraction 66%, trace pulmonic valve regurgitation and thinning of the interatrial septum. Lipid panel done November 18, 2022 demonstrated cholesterol 243, triglycerides of 55, , LDL calculated 126 mg/dL. She will continue on her current diet and exercise program. The patient had normal exercise stress test July 6, 2022. Echo Doppler examination done June 30, 2020 demonstrated borderline mitral valve prolapse and minimal to mild mitral valve regurgitation, mild aortic valve regurgitation, minimal to mild tricuspid valve regurgitation, minimal pulmonic valve regurgitation. Blood work done October 26, 2021 demonstrated cholesterol 232, triglycerides 56, , and LDL calculated 120 with a hemoglobin A1c of 5.8. The patient had a normal exercise stress test June 30, 2020. Electrocardiogram done April 30, 2019 demonstrated sinus bradycardia rate of 58 beats per minute and is otherwise unremarkable. The patient will maintain adequate hydration.  The patient understands that aerobic exercises must be increased to 40 minutes 4 times per week. A detailed discussion of lifestyle modification was done today. The patient has a good understanding of the diagnosis, and treatment plan. Lifestyle modification was also outlined.

## 2024-03-18 NOTE — PHYSICAL EXAM
[General Appearance - Well Developed] : well developed [Normal Appearance] : normal appearance [Well Groomed] : well groomed [General Appearance - Well Nourished] : well nourished [No Deformities] : no deformities [General Appearance - In No Acute Distress] : no acute distress [Normal Conjunctiva] : the conjunctiva exhibited no abnormalities [Normal Oral Mucosa] : normal oral mucosa [Normal Oropharynx] : normal oropharynx [JVD Elevated _____cm] : JVD elevated [unfilled] ~U cm above clavicle [Normal Jugular Venous A Waves Present] : normal jugular venous A waves present [Normal Jugular Venous V Waves Present] : normal jugular venous V waves present [No Jugular Venous Montgomery A Waves] : no jugular venous montgomery A waves [Respiration, Rhythm And Depth] : normal respiratory rhythm and effort [Exaggerated Use Of Accessory Muscles For Inspiration] : no accessory muscle use [Auscultation Breath Sounds / Voice Sounds] : lungs were clear to auscultation bilaterally [Bowel Sounds] : normal bowel sounds [Abdomen Soft] : soft [Abnormal Walk] : normal gait [Gait - Sufficient For Exercise Testing] : the gait was sufficient for exercise testing [Nail Clubbing] : no clubbing of the fingernails [Cyanosis, Localized] : no localized cyanosis [Skin Color & Pigmentation] : normal skin color and pigmentation [Skin Turgor] : normal skin turgor [] : no rash [Oriented To Time, Place, And Person] : oriented to person, place, and time [Impaired Insight] : insight and judgment were intact [No Anxiety] : not feeling anxious [5th Left ICS - MCL] : palpated at the 5th LICS in the midclavicular line [Normal] : normal [No Precordial Heave] : no precordial heave was noted [Normal Rate] : normal [Rhythm Regular] : regular [Normal S1] : normal S1 [Normal S2] : normal S2 [No Gallop] : no gallop heard [I] : a grade 1 [2+] : left 2+ [No Abnormalities] : the abdominal aorta was not enlarged and no bruit was heard [No Pitting Edema] : no pitting edema present [Apical Thrill] : no thrill palpable at the apex [S3] : no S3 [S4] : no S4 [Click] : no click [Pericardial Rub] : no pericardial rub [Left Carotid Bruit] : no bruit heard over the left carotid [Right Carotid Bruit] : no bruit heard over the right carotid [Right Femoral Bruit] : no bruit heard over the right femoral artery [Left Femoral Bruit] : no bruit heard over the left femoral artery

## 2024-03-18 NOTE — REASON FOR VISIT
[CV Risk Factors and Non-Cardiac Disease] : CV risk factors and non-cardiac disease [Follow-Up - Clinic] : a clinic follow-up of [Dyspnea] : dyspnea [Mitral Regurgitation] : mitral regurgitation [Palpitations] : palpitations [FreeTextEntry1] : This is a 68-year-old female who comes in for followup cardiac evaluation Past medical history significant for mitral valve prolapse, occasional palpitations, hyperlipidemia, and prediabetes. - Cardiac risk factors include hyperlipidemia. - Social history: denies smoking and drinks occasionally.   The patient is doing well today and denies recent chest pain, shortness breath, palpitations, dizziness or syncope. She may feel dyspnea on exertion at times.  Denies taking any medications currently. States she takes over the counter vitamin D, B12, C.    Labs 7/18/23 demonstrated cholesterol 238, direct , HDL 99, triglycerides 98, a1c 5.8. Repeat blood work to be collected today.

## 2024-04-02 ENCOUNTER — NON-APPOINTMENT (OUTPATIENT)
Age: 69
End: 2024-04-02

## 2024-04-02 DIAGNOSIS — E78.5 HYPERLIPIDEMIA, UNSPECIFIED: ICD-10-CM

## 2024-04-05 ENCOUNTER — APPOINTMENT (OUTPATIENT)
Dept: CT IMAGING | Facility: CLINIC | Age: 69
End: 2024-04-05
Payer: SELF-PAY

## 2024-04-05 ENCOUNTER — OUTPATIENT (OUTPATIENT)
Dept: OUTPATIENT SERVICES | Facility: HOSPITAL | Age: 69
LOS: 1 days | End: 2024-04-05
Payer: SELF-PAY

## 2024-04-05 DIAGNOSIS — E78.5 HYPERLIPIDEMIA, UNSPECIFIED: ICD-10-CM

## 2024-04-05 PROCEDURE — 75571 CT HRT W/O DYE W/CA TEST: CPT | Mod: 26

## 2024-04-05 PROCEDURE — 75571 CT HRT W/O DYE W/CA TEST: CPT

## 2024-04-08 ENCOUNTER — TRANSCRIPTION ENCOUNTER (OUTPATIENT)
Age: 69
End: 2024-04-08

## 2024-04-09 ENCOUNTER — TRANSCRIPTION ENCOUNTER (OUTPATIENT)
Age: 69
End: 2024-04-09

## 2024-06-13 ENCOUNTER — LABORATORY RESULT (OUTPATIENT)
Age: 69
End: 2024-06-13

## 2025-02-08 ENCOUNTER — EMERGENCY (EMERGENCY)
Facility: HOSPITAL | Age: 70
LOS: 1 days | Discharge: HOME | End: 2025-02-08
Attending: EMERGENCY MEDICINE
Payer: MEDICARE

## 2025-02-08 VITALS
RESPIRATION RATE: 20 BRPM | TEMPERATURE: 98 F | OXYGEN SATURATION: 100 % | HEART RATE: 76 BPM | SYSTOLIC BLOOD PRESSURE: 141 MMHG | HEIGHT: 62.5 IN | DIASTOLIC BLOOD PRESSURE: 71 MMHG | WEIGHT: 110.01 LBS

## 2025-02-08 VITALS
DIASTOLIC BLOOD PRESSURE: 68 MMHG | RESPIRATION RATE: 19 BRPM | SYSTOLIC BLOOD PRESSURE: 111 MMHG | TEMPERATURE: 99 F | HEART RATE: 72 BPM | OXYGEN SATURATION: 99 %

## 2025-02-08 PROCEDURE — 76377 3D RENDER W/INTRP POSTPROCES: CPT

## 2025-02-08 PROCEDURE — 76377 3D RENDER W/INTRP POSTPROCES: CPT | Mod: 26

## 2025-02-08 PROCEDURE — 73700 CT LOWER EXTREMITY W/O DYE: CPT | Mod: 26,RT

## 2025-02-08 PROCEDURE — 73552 X-RAY EXAM OF FEMUR 2/>: CPT

## 2025-02-08 PROCEDURE — 99284 EMERGENCY DEPT VISIT MOD MDM: CPT | Mod: 25

## 2025-02-08 PROCEDURE — 73564 X-RAY EXAM KNEE 4 OR MORE: CPT

## 2025-02-08 PROCEDURE — 73700 CT LOWER EXTREMITY W/O DYE: CPT | Mod: MC

## 2025-02-08 PROCEDURE — 73552 X-RAY EXAM OF FEMUR 2/>: CPT | Mod: 26,RT

## 2025-02-08 PROCEDURE — 99284 EMERGENCY DEPT VISIT MOD MDM: CPT

## 2025-02-08 PROCEDURE — 73590 X-RAY EXAM OF LOWER LEG: CPT

## 2025-02-08 PROCEDURE — 73590 X-RAY EXAM OF LOWER LEG: CPT | Mod: 26,RT

## 2025-02-08 PROCEDURE — 73564 X-RAY EXAM KNEE 4 OR MORE: CPT | Mod: 26,LT,RT

## 2025-02-08 RX ORDER — ACETAMINOPHEN 160 MG/5ML
650 SUSPENSION ORAL ONCE
Refills: 0 | Status: COMPLETED | OUTPATIENT
Start: 2025-02-08 | End: 2025-02-08

## 2025-02-08 RX ORDER — IBUPROFEN 600 MG/1
600 TABLET, FILM COATED ORAL ONCE
Refills: 0 | Status: COMPLETED | OUTPATIENT
Start: 2025-02-08 | End: 2025-02-08

## 2025-02-08 RX ADMIN — ACETAMINOPHEN 650 MILLIGRAM(S): 160 SUSPENSION ORAL at 17:43

## 2025-02-08 RX ADMIN — IBUPROFEN 600 MILLIGRAM(S): 600 TABLET, FILM COATED ORAL at 17:43

## 2025-02-08 NOTE — ED PROVIDER NOTE - CARE PROVIDER_API CALL
Tony Christianson  Orthopaedic Trauma  [address], [address]  [place] [address]  Phone: [phone]  Fax: [phone]  Follow Up Time:

## 2025-02-08 NOTE — ED PROVIDER NOTE - OBJECTIVE STATEMENT
This is a 69-year-old female who is otherwise well on no medications no past medical history coming in after slipping outside falling onto her knees.  She did not hit her head or anything else.  She has significant right knee pain as well as mild left knee pain.  She has been unable to walk due to the pain in her right knee though she thinks that the pain in her left knee is minimal.  No back pain chest pain arm or head injury.

## 2025-02-08 NOTE — ED PROVIDER NOTE - CLINICAL SUMMARY MEDICAL DECISION MAKING FREE TEXT BOX
High concern for right knee fracture and potential left knee fracture as well.  X-ray both knees Tylenol high-dose Motrin and reassess the patient.–Rafael Chen

## 2025-02-08 NOTE — ED PROVIDER NOTE - NSFOLLOWUPINSTRUCTIONS_ED_ALL_ED_FT
IMPORTANT INSTRUCTIONS FROM Dr. JETER:    Please follow up with your personal medical doctor in 24-48 hours. See orthopedics within 1 wk.  Bring results from today to your visit.    If you were advised to take any medications - be sure to review the package insert.    If your symptoms change, get worse or if you have any new symptoms, come to the ER right away.  If you have any questions, call the ER at the phone number on this page.

## 2025-02-08 NOTE — ED PROVIDER NOTE - PROGRESS NOTE DETAILS
feeling better and wants to go home, wbat w crutches . reviewed case and results w pt, questions answered and pt understands, advised return precautions and care plan.

## 2025-02-08 NOTE — ED PROVIDER NOTE - PHYSICAL EXAMINATION
Awake alert talking no acute distress.  Full range of motion of upper extremities.  Full range of motion of left lower extremity.  There is ecchymosis to the patella.  There is no bony tenderness to the rest of the leg on the left.  The right lower extremity has very significant joint edema.  There is diffuse tenderness which is nonfocal.  There is no tenderness proximal or distal to the knee in that leg.  She has a normal dorsalis pedis pulse and her leg is warm and well-perfused.

## 2025-02-08 NOTE — ED PROVIDER NOTE - PATIENT PORTAL LINK FT
You can access the FollowMyHealth Patient Portal offered by [facility] by registering at the following website: [email or web address]. By joining [employer]’s FollowMyHealth portal, you will also be able to view your health information using other applications (apps) compatible with our system.

## 2025-02-08 NOTE — ED ADULT NURSE NOTE - OBJECTIVE STATEMENT
68 yo F pt no PMHx p/w fall down stairs landing on b/l knees c/o R sided knee pain and swelling tried nothing for pain relief.   pt is A&Ox4, R knee moderately swollen with ecchymosis PMS intact, limited ROM d/t pain.  Pt denies: LOC/AC use,  headache, dizziness, chest pain, palpitations, cough, SOB, abdominal pain, n/v/d, urinary symptoms, fevers, chills, weakness at this time.

## 2025-02-09 ENCOUNTER — EMERGENCY (EMERGENCY)
Facility: HOSPITAL | Age: 70
LOS: 1 days | Discharge: HOME | End: 2025-02-09
Attending: EMERGENCY MEDICINE
Payer: MEDICARE

## 2025-02-09 VITALS
HEIGHT: 62.5 IN | DIASTOLIC BLOOD PRESSURE: 72 MMHG | OXYGEN SATURATION: 96 % | WEIGHT: 110.01 LBS | TEMPERATURE: 99 F | HEART RATE: 84 BPM | RESPIRATION RATE: 18 BRPM | SYSTOLIC BLOOD PRESSURE: 115 MMHG

## 2025-02-09 VITALS
OXYGEN SATURATION: 98 % | RESPIRATION RATE: 18 BRPM | TEMPERATURE: 98 F | SYSTOLIC BLOOD PRESSURE: 120 MMHG | HEART RATE: 82 BPM | DIASTOLIC BLOOD PRESSURE: 81 MMHG

## 2025-02-09 PROCEDURE — 99284 EMERGENCY DEPT VISIT MOD MDM: CPT

## 2025-02-09 NOTE — ED PROVIDER NOTE - PROGRESS NOTE DETAILS
Patient was evaluated Ortho, okay to discharge with knee immobilizer and weightbearing as tolerated.  Britta Saez MD PGY-2 Initial Size Of Lesion: 1.4

## 2025-02-09 NOTE — ED PROVIDER NOTE - PATIENT PORTAL LINK FT
You can access the FollowMyHealth Patient Portal offered by [facility] by registering at the following website: [email or web address]. By joining [employer]’s FollowMyHealth portal, you will also be able to view your health information using other applications (apps) compatible with our system.

## 2025-02-09 NOTE — CONSULT NOTE ADULT - ASSESSMENT
ASSESSMENT & PLAN  69yFemale w/ r/o R knee hemarthrosis and imaging negative for fx or dislocation.    Plan:  -WBAT RLE in KI for comfort  -pain control  -ice/cold compress, elevation  -no acute ortho surgical intervention at this time  -f/u outpatient with Dr. Christianson this week, call office for appt      Marilee Martin, PGY-2  Orthopedic Surgery    [facility]: [id number]  [facility]: [id number]  [facility]:  [id number]/ [phone]

## 2025-02-09 NOTE — CONSULT NOTE ADULT - SUBJECTIVE AND OBJECTIVE BOX
HPI  69yFemale c/o R knee pain s/p mechanical fall onto R knee yesterday while walking up the stairs. Denies headstrike or LOC. Pt came into ED yesterday and XR were negative, so pt went home and has been using crutches since. Returns today due to CT read saying that an occult fx cannot be excluded. Denies numbness/tingling in the RLE. Denies any other trauma/injuries at this time. At baseline, community ambulator w/o assistive devices.    ROS  Negative unless otherwise specified in HPI.    PAST MEDICAL & SURGICAL Hx  PAST MEDICAL & SURGICAL HISTORY:      MEDICATIONS  Home Medications:      ALLERGIES  No Known Allergies      FAMILY Hx  FAMILY HISTORY:      SOCIAL Hx  Social History:      VITALS  Vital Signs Last 24 Hrs  T(C): 37.2 (09 Feb 2025 16:45), Max: 37.3 (08 Feb 2025 22:37)  T(F): 99 (09 Feb 2025 16:45), Max: 99.2 (08 Feb 2025 22:37)  HR: 75 (09 Feb 2025 16:45) (72 - 84)  BP: 107/73 (09 Feb 2025 16:45) (107/73 - 115/72)  BP(mean): --  RR: 18 (09 Feb 2025 16:45) (18 - 19)  SpO2: 97% (09 Feb 2025 16:45) (96% - 99%)    Parameters below as of 09 Feb 2025 16:45  Patient On (Oxygen Delivery Method): room air        PHYSICAL EXAM  Gen: Lying in bed, NAD  Resp: No increased WOB  RLE:  Skin intact, +effusion of knee and +ecchymosis over R knee  minimal TTP over R knee, no TTP along remainder of extremity; compartments soft  Limited ROM at knee 2/2 pain  Able to straight leg raise  No gross varus/valgus laxity, neg ant drawer  Motor: TA/EHL/GS/FHL intact  Sensory: DP/SP/Tib/Maggy/Saph SILT  +DP pulse (symmetric relative to contralateral side), WWP  Able to take a few steps in ED    Secondary survey:  No TTP along spine or other extremities, pelvis grossly stable, SILT and compartments soft throughout    LABS              IMAGING  < from: Xray Tibia + Fibula 2 Views, Right (02.08.25 @ 18:45) >  Right femur:    No acute displaced fracture. Right femoral head is seated within the   acetabulum. Right hip joint space preserved.    Right knee:    No acute displaced fracture or dislocation. Tricompartmental knee   osteoarthritis. Large knee joint effusion.    < end of copied text >  < from: Xray Tibia + Fibula 2 Views, Right (02.08.25 @ 18:45) >  Right tibia/fibula:    No acute displaced fracture.    < end of copied text >  < from: CT Knee No Cont, Right (02.08.25 @ 20:52) >  IMPRESSION:    No acute displaced fractureor dislocation. However, given the presence   of a hemarthrosis, a radio-occult fracture within the knee cannot be   excluded and is within the differential. Recommend further evaluation   with MRI of the right knee.    Large hemarthrosis within the knee. Large popliteal cyst containing blood   products.    Other findings as above.    These findings and the change from the preliminary interpretation have   been communicated by Dr. Latif to BRYSON Diaz on 2/9/2025 at   12:56 PM with readback confirmation.    < end of copied text >

## 2025-02-09 NOTE — ED POST DISCHARGE NOTE - DETAILS
DEVAN for 1522.  [facility] for 1522.  Joe  -  Pt called back, doing well, explained she should come back to the ER which she will do right now.  Joe

## 2025-02-09 NOTE — ED ADULT TRIAGE NOTE - CHIEF COMPLAINT QUOTE
Patient called back for abnormal CT. Was here yesterday for fall with R leg pain. Called back for possible tibia injury/fracture.

## 2025-02-09 NOTE — ED PROVIDER NOTE - CLINICAL SUMMARY MEDICAL DECISION MAKING FREE TEXT BOX
Patient is a 69-year-old female with no past medical history and on blood thinners who presents for possible tibial fracture after radiology give her a call back on the CT that was done yesterday.  Patient had a trip and fall.  Was not seen to have any acute fractures has no hemarthrosis on right knee.  Was sent home on crutches.  She denies any worsening pain, pain with passive motion, abdominal pain, nausea, vomiting, chest pain, shortness of breath.    On physical exam patient has right knee swelling with bruising, 2+ DP pulses, dorsiflexion and plantarflexion 5 out of 5 strength, sensation intact, able to range knee with pain.    Limited concern for any acute fracture.  To reach out to orthopedics to determine if more imaging is necessary.  Likely discharge with knee immobilizer and outpatient orthopedic follow-up.

## 2025-02-09 NOTE — ED POST DISCHARGE NOTE - RESULT SUMMARY
Radiology called, explained that they will be adding "high suspicion of fracture recommend MRI" to the CT of the knee.  Pt was given crutches but no splint was applied.  Calling back to the ER for splint and possible repeat imaging.  Joe

## 2025-02-09 NOTE — ED PROVIDER NOTE - NSFOLLOWUPINSTRUCTIONS_ED_ALL_ED_FT
You were evaluated in the Emergency Department today for your knee pain. You were evaluated by orthopedics who indicates that you do not have any acute fracture.  Please wear your knee immobilizer as needed for comfort.  You may do weightbearing as tolerated as per orthopedic instructions.    Your knee has been ace wrapped and you were given crutches in the ER to help your knee heal. Please rest, ice and elevate your knee, and resume normal activities as tolerated.    We recommend you take 600mg ibuprofen every 6 hours or tylenol 650mg every 6 hours as needed for pain. If needed, you can alternate these medications so that you take one medication every 3 hours. For instance, at noon take ibuprofen, then at 3pm take tylenol, then at 6pm take ibuprofen. Please take medication as per package instructions.     Please follow up with your orthopedic doctor.    Return to the Emergency Department if you experience worsening or uncontrolled pain, numbness, tingling, or weakness to your legs, difficulty walking, worsening knee swelling or redness, or any other concerning symptoms.

## 2025-02-09 NOTE — ED ADULT NURSE NOTE - OBJECTIVE STATEMENT
Patient is a 69y female coming in due to an abnormal CT result. Patient is AOx4, was at [facility] yesterday for a fall with right leg pain. Today she reports difficulty and pain with walking and moving the right leg. States she cannot bend right leg or bear weight on it due to increase in pain. Patient states she had chills last night. Denies fever, chest pain, n/v/d. Breathing is spontaneous, unlabored. Chest rise is symmetrical. Abdomen is soft, nondistended. Skin is warm, dry, intact. Family at bedside. Safety and comfort measures intact Patient is a 69y female coming in due to an abnormal CT result. Patient is AOx4, was at [facility] yesterday for a fall with right leg pain. Today she reports difficulty and pain with walking and moving the right leg. States she cannot bend right leg or bear weight on it due to increase in pain. Patient states she had chills last night. Denies fever, chest pain, n/v/d. Breathing is spontaneous, unlabored. Chest rise is symmetrical. Abdomen is soft, nondistended. Skin is warm, dry, intact. Family at bedside. Safety and comfort measures provided.

## 2025-02-09 NOTE — ED PROVIDER NOTE - ATTENDING CONTRIBUTION TO CARE
RLE: ecchymosis center medial knee, +suprapatellar lateral effusion, no bony tibial TTP, limited ROM 2/2 pain, pedal pulse palpable.

## 2025-02-13 ENCOUNTER — OUTPATIENT (OUTPATIENT)
Dept: OUTPATIENT SERVICES | Facility: HOSPITAL | Age: 70
LOS: 1 days | End: 2025-02-13
Payer: MEDICARE

## 2025-02-13 ENCOUNTER — APPOINTMENT (OUTPATIENT)
Dept: MRI IMAGING | Facility: CLINIC | Age: 70
End: 2025-02-13
Payer: MEDICARE

## 2025-02-13 ENCOUNTER — APPOINTMENT (OUTPATIENT)
Dept: ORTHOPEDIC SURGERY | Facility: CLINIC | Age: 70
End: 2025-02-13
Payer: MEDICARE

## 2025-02-13 VITALS — HEIGHT: 62 IN | WEIGHT: 110 LBS | BODY MASS INDEX: 20.24 KG/M2

## 2025-02-13 DIAGNOSIS — S89.91XA UNSPECIFIED INJURY OF RIGHT LOWER LEG, INITIAL ENCOUNTER: ICD-10-CM

## 2025-02-13 PROBLEM — M25.461 EFFUSION OF RIGHT KNEE: Status: ACTIVE | Noted: 2025-02-13

## 2025-02-13 PROCEDURE — 99204 OFFICE O/P NEW MOD 45 MIN: CPT

## 2025-02-13 PROCEDURE — 73721 MRI JNT OF LWR EXTRE W/O DYE: CPT | Mod: 26,RT

## 2025-02-13 PROCEDURE — 73721 MRI JNT OF LWR EXTRE W/O DYE: CPT

## 2025-02-14 RX ORDER — DICLOFENAC SODIUM 75 MG/1
75 TABLET, DELAYED RELEASE ORAL
Qty: 1 | Refills: 0 | Status: ACTIVE | COMMUNITY
Start: 2025-02-14 | End: 1900-01-01

## 2025-02-17 ENCOUNTER — NON-APPOINTMENT (OUTPATIENT)
Age: 70
End: 2025-02-17

## 2025-02-19 ENCOUNTER — APPOINTMENT (OUTPATIENT)
Dept: ORTHOPEDIC SURGERY | Facility: CLINIC | Age: 70
End: 2025-02-19

## 2025-02-20 ENCOUNTER — APPOINTMENT (OUTPATIENT)
Dept: ORTHOPEDIC SURGERY | Facility: CLINIC | Age: 70
End: 2025-02-20
Payer: MEDICARE

## 2025-02-20 ENCOUNTER — APPOINTMENT (OUTPATIENT)
Dept: ORTHOPEDIC SURGERY | Facility: CLINIC | Age: 70
End: 2025-02-20

## 2025-02-20 ENCOUNTER — NON-APPOINTMENT (OUTPATIENT)
Age: 70
End: 2025-02-20

## 2025-02-20 VITALS — BODY MASS INDEX: 20.98 KG/M2 | WEIGHT: 114 LBS | HEIGHT: 62 IN

## 2025-02-20 DIAGNOSIS — S83.249A OTHER TEAR OF MEDIAL MENISCUS, CURRENT INJURY, UNSPECIFIED KNEE, INITIAL ENCOUNTER: ICD-10-CM

## 2025-02-20 DIAGNOSIS — M25.461 EFFUSION, RIGHT KNEE: ICD-10-CM

## 2025-02-20 DIAGNOSIS — M71.20 SYNOVIAL CYST OF POPLITEAL SPACE [BAKER], UNSPECIFIED KNEE: ICD-10-CM

## 2025-02-20 PROCEDURE — 20611 DRAIN/INJ JOINT/BURSA W/US: CPT | Mod: RT

## 2025-02-20 PROCEDURE — 20612 ASPIRATE/INJ GANGLION CYST: CPT | Mod: RT

## 2025-02-20 PROCEDURE — 99215 OFFICE O/P EST HI 40 MIN: CPT | Mod: 25

## 2025-02-20 RX ORDER — DICLOFENAC SODIUM 20 MG/G
2 SOLUTION TOPICAL 3 TIMES DAILY
Qty: 1 | Refills: 0 | Status: ACTIVE | COMMUNITY
Start: 2025-02-20 | End: 1900-01-01

## 2025-02-26 ENCOUNTER — APPOINTMENT (OUTPATIENT)
Dept: ORTHOPEDIC SURGERY | Facility: CLINIC | Age: 70
End: 2025-02-26

## 2025-03-19 ENCOUNTER — NON-APPOINTMENT (OUTPATIENT)
Age: 70
End: 2025-03-19

## 2025-03-20 ENCOUNTER — APPOINTMENT (OUTPATIENT)
Dept: ORTHOPEDIC SURGERY | Facility: CLINIC | Age: 70
End: 2025-03-20
Payer: MEDICARE

## 2025-03-20 DIAGNOSIS — M25.461 EFFUSION, RIGHT KNEE: ICD-10-CM

## 2025-03-20 DIAGNOSIS — M17.10 UNILATERAL PRIMARY OSTEOARTHRITIS, UNSPECIFIED KNEE: ICD-10-CM

## 2025-03-20 PROCEDURE — G2211 COMPLEX E/M VISIT ADD ON: CPT

## 2025-03-20 PROCEDURE — 99213 OFFICE O/P EST LOW 20 MIN: CPT

## 2025-03-28 ENCOUNTER — LABORATORY RESULT (OUTPATIENT)
Age: 70
End: 2025-03-28

## 2025-03-28 ENCOUNTER — APPOINTMENT (OUTPATIENT)
Dept: CARDIOLOGY | Facility: CLINIC | Age: 70
End: 2025-03-28
Payer: MEDICARE

## 2025-03-28 VITALS
HEART RATE: 79 BPM | HEIGHT: 62 IN | RESPIRATION RATE: 16 BRPM | SYSTOLIC BLOOD PRESSURE: 119 MMHG | BODY MASS INDEX: 20.24 KG/M2 | DIASTOLIC BLOOD PRESSURE: 70 MMHG | OXYGEN SATURATION: 99 % | WEIGHT: 110 LBS | TEMPERATURE: 98.2 F

## 2025-03-28 DIAGNOSIS — E78.5 HYPERLIPIDEMIA, UNSPECIFIED: ICD-10-CM

## 2025-03-28 DIAGNOSIS — I34.1 NONRHEUMATIC MITRAL (VALVE) PROLAPSE: ICD-10-CM

## 2025-03-28 DIAGNOSIS — R73.03 PREDIABETES.: ICD-10-CM

## 2025-03-28 DIAGNOSIS — I34.0 NONRHEUMATIC MITRAL (VALVE) INSUFFICIENCY: ICD-10-CM

## 2025-03-28 DIAGNOSIS — R06.09 OTHER FORMS OF DYSPNEA: ICD-10-CM

## 2025-03-28 PROCEDURE — G2211 COMPLEX E/M VISIT ADD ON: CPT

## 2025-03-28 PROCEDURE — 99214 OFFICE O/P EST MOD 30 MIN: CPT

## 2025-05-15 ENCOUNTER — APPOINTMENT (OUTPATIENT)
Dept: ORTHOPEDIC SURGERY | Facility: CLINIC | Age: 70
End: 2025-05-15
Payer: MEDICARE

## 2025-05-15 DIAGNOSIS — M25.461 EFFUSION, RIGHT KNEE: ICD-10-CM

## 2025-05-15 DIAGNOSIS — M17.10 UNILATERAL PRIMARY OSTEOARTHRITIS, UNSPECIFIED KNEE: ICD-10-CM

## 2025-05-15 PROCEDURE — 99213 OFFICE O/P EST LOW 20 MIN: CPT

## 2025-05-15 PROCEDURE — G2211 COMPLEX E/M VISIT ADD ON: CPT
